# Patient Record
(demographics unavailable — no encounter records)

---

## 2025-06-28 NOTE — HISTORY OF PRESENT ILLNESS
[de-identified] : RENAE SANCHEZ is a 33-year-old female who presents with left shoulder pain onset 2 weeks ago. LHD. The patient reports that the pain began after lifting a 52-pound suitcase while on vacation. Initially, she did not feel any pain, but during the drive home, she experienced shooting pain from the shoulder down to her fingers, accompanied by a sensation of restricted blood circulation. The pain subsided slightly during a flight from [place] to [place] but returned and intensified after lifting again. The pain has been persistent for the last 14 days and is described as more than soreness, with difficulty lifting objects and performing certain movements. The patient notes that the pain is localized to the posterior shoulder and is associated with some back discomfort, likely due to compensatory changes from carrying luggage. She has been off work for the past couple of weeks and will continue to be off for the next 2 weeks. 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 She has not taken any medication for the pain and denies numbness or tingling. PMHx of anemia for which she takes iron OTC as well as Vitamin D3 supplements.

## 2025-06-28 NOTE — DISCUSSION/SUMMARY

## 2025-06-28 NOTE — DISCUSSION/SUMMARY

## 2025-06-28 NOTE — ADDENDUM
[FreeTextEntry1] : I, Hannah Hernandez ([facility]) assisted in filling out this chart under the dictation of Delma Montoya on 06/24/2025.

## 2025-06-28 NOTE — DISCUSSION/SUMMARY

## 2025-06-28 NOTE — PHYSICAL EXAM
[de-identified] : General: Well-nourished, well-developed, alert, and in no acute distress. Head: Normocephalic. Eyes: Pupils equal, extraocular muscles intact, normal sclera. Nose: No nasal discharge. Cardiovascular: Extremities are warm and well perfused. Respiratory: No labored breathing. Extremities: Sensation is intact distally bilaterally.  Lymphatic: No regional lymphadenopathy, no lymphedema Neurologic: No focal deficits Skin: Normal skin color, texture, and turgor Psychiatric: Normal affect  MSK: C-spine demonstrates normal lordosis, no tenderness to palpation midline, paraspinals AROM full and pain-free Cervical facet loading negative Spurling's Compression negative   Examination of left shoulder shows no overlying skin changes or muscular atrophy. There is no tenderness to palpation over the AC joint, Bicipital groove, Trapezius   Range of motion shows forward elevation of [160], abduction [160], external rotation [40], and internal rotation to the [lumbar spine].  There is pain at the end range of motion.   Special Tests: Painful Arc [positive] Neer's negative Hawkin's negative Comfort's positive Resisted ext rotation negative Hornblower positive Lift-Off [positive] [place] positive Speed's negative

## 2025-06-28 NOTE — END OF VISIT
[FreeTextEntry3] : Documented by Hannah Hernandez acting as a scribe for Delma Montoya on 06/24/2025.   All medical record entries made by the Scribe were at my, Dr. Delma Montoya, direction and personally dictated by me on 06/24/2025. I have reviewed the chart and agree that the record accurately reflects my personal performance of the history, physical exam, assessment, and plan. I have also personally directed, reviewed, and agreed with the chart.  [Time Spent: ___ minutes] : I have spent [unfilled] minutes of time on the encounter which excludes teaching and separately reported services.

## 2025-06-28 NOTE — PHYSICAL EXAM
[de-identified] : General: Well-nourished, well-developed, alert, and in no acute distress. Head: Normocephalic. Eyes: Pupils equal, extraocular muscles intact, normal sclera. Nose: No nasal discharge. Cardiovascular: Extremities are warm and well perfused. Respiratory: No labored breathing. Extremities: Sensation is intact distally bilaterally.  Lymphatic: No regional lymphadenopathy, no lymphedema Neurologic: No focal deficits Skin: Normal skin color, texture, and turgor Psychiatric: Normal affect  MSK: C-spine demonstrates normal lordosis, no tenderness to palpation midline, paraspinals AROM full and pain-free Cervical facet loading negative Spurling's Compression negative   Examination of left shoulder shows no overlying skin changes or muscular atrophy. There is no tenderness to palpation over the AC joint, Bicipital groove, Trapezius   Range of motion shows forward elevation of [160], abduction [160], external rotation [40], and internal rotation to the [lumbar spine].  There is pain at the end range of motion.   Special Tests: Painful Arc [positive] Neer's negative Hawkin's negative Comfort's positive Resisted ext rotation negative Hornblower positive Lift-Off [positive] [place] positive Speed's negative

## 2025-06-28 NOTE — HISTORY OF PRESENT ILLNESS
[de-identified] : RENAE SANCHEZ is a 33-year-old female who presents with left shoulder pain onset 2 weeks ago. LHD. The patient reports that the pain began after lifting a 52-pound suitcase while on vacation. Initially, she did not feel any pain, but during the drive home, she experienced shooting pain from the shoulder down to her fingers, accompanied by a sensation of restricted blood circulation. The pain subsided slightly during a flight from [place] to [place] but returned and intensified after lifting again. The pain has been persistent for the last 14 days and is described as more than soreness, with difficulty lifting objects and performing certain movements. The patient notes that the pain is localized to the posterior shoulder and is associated with some back discomfort, likely due to compensatory changes from carrying luggage. She has been off work for the past couple of weeks and will continue to be off for the next 2 weeks. 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 She has not taken any medication for the pain and denies numbness or tingling. PMHx of anemia for which she takes iron OTC as well as Vitamin D3 supplements.

## 2025-06-28 NOTE — ADDENDUM
[FreeTextEntry1] : I, Hannah Hernandez ([facility]) assisted in filling out this chart under the dictation of Delma Montoya on 06/24/2025.

## 2025-06-28 NOTE — PHYSICAL EXAM
[de-identified] : General: Well-nourished, well-developed, alert, and in no acute distress. Head: Normocephalic. Eyes: Pupils equal, extraocular muscles intact, normal sclera. Nose: No nasal discharge. Cardiovascular: Extremities are warm and well perfused. Respiratory: No labored breathing. Extremities: Sensation is intact distally bilaterally.  Lymphatic: No regional lymphadenopathy, no lymphedema Neurologic: No focal deficits Skin: Normal skin color, texture, and turgor Psychiatric: Normal affect  MSK: C-spine demonstrates normal lordosis, no tenderness to palpation midline, paraspinals AROM full and pain-free Cervical facet loading negative Spurling's Compression negative   Examination of left shoulder shows no overlying skin changes or muscular atrophy. There is no tenderness to palpation over the AC joint, Bicipital groove, Trapezius   Range of motion shows forward elevation of [160], abduction [160], external rotation [40], and internal rotation to the [lumbar spine].  There is pain at the end range of motion.   Special Tests: Painful Arc [positive] Neer's negative Hawkin's negative Comfort's positive Resisted ext rotation negative Hornblower positive Lift-Off [positive] [place] positive Speed's negative

## 2025-06-28 NOTE — ADDENDUM
[FreeTextEntry1] : I, Hannah Hernandez ([facility]) assisted in filling out this chart under the dictation of Delma Montoya on 06/24/2025.

## 2025-06-28 NOTE — ASSESSMENT
[FreeTextEntry1] : RENAE SANCHEZ is a 33-year-old female with left shoulder pain. I discussed with the patient that their symptoms, signs, and imaging are most consistent with rotator cuff strain. We reviewed the natural history of this condition and treatment options. We agreed on the following plan:    Activity Modification: Avoid overhead lifting, i.e. Arnold press, shoulder press, full plank, and push-ups. Start Home Exercises for shoulder stretching and strengthening (Neer protocol). Demonstration and handout provided. Start physical therapy. Referral provided. Medication: Diclofenac Gel PRN. Advanced Imaging: Consider XR, MRI if no symptomatic improvement. Follow up in 6-8 weeks.

## 2025-06-28 NOTE — HISTORY OF PRESENT ILLNESS
[de-identified] : RENAE SANCHEZ is a 33-year-old female who presents with left shoulder pain onset 2 weeks ago. LHD. The patient reports that the pain began after lifting a 52-pound suitcase while on vacation. Initially, she did not feel any pain, but during the drive home, she experienced shooting pain from the shoulder down to her fingers, accompanied by a sensation of restricted blood circulation. The pain subsided slightly during a flight from [place] to [place] but returned and intensified after lifting again. The pain has been persistent for the last 14 days and is described as more than soreness, with difficulty lifting objects and performing certain movements. The patient notes that the pain is localized to the posterior shoulder and is associated with some back discomfort, likely due to compensatory changes from carrying luggage. She has been off work for the past couple of weeks and will continue to be off for the next 2 weeks. 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 She has not taken any medication for the pain and denies numbness or tingling. PMHx of anemia for which she takes iron OTC as well as Vitamin D3 supplements.